# Patient Record
Sex: FEMALE | Race: WHITE | Employment: FULL TIME | URBAN - METROPOLITAN AREA
[De-identification: names, ages, dates, MRNs, and addresses within clinical notes are randomized per-mention and may not be internally consistent; named-entity substitution may affect disease eponyms.]

---

## 2018-07-12 ENCOUNTER — HOSPITAL ENCOUNTER (EMERGENCY)
Facility: CLINIC | Age: 23
Discharge: HOME | End: 2018-07-12
Attending: EMERGENCY MEDICINE | Admitting: EMERGENCY MEDICINE
Payer: COMMERCIAL

## 2018-07-12 VITALS
SYSTOLIC BLOOD PRESSURE: 118 MMHG | TEMPERATURE: 98.4 F | DIASTOLIC BLOOD PRESSURE: 91 MMHG | HEIGHT: 64 IN | WEIGHT: 135 LBS | BODY MASS INDEX: 23.05 KG/M2 | HEART RATE: 160 BPM | OXYGEN SATURATION: 98 % | RESPIRATION RATE: 16 BRPM

## 2018-07-12 DIAGNOSIS — R00.0 SINUS TACHYCARDIA: ICD-10-CM

## 2018-07-12 LAB
ALBUMIN UR-MCNC: NEGATIVE MG/DL
ANION GAP SERPL CALCULATED.3IONS-SCNC: 9 MMOL/L (ref 3–14)
APPEARANCE UR: ABNORMAL
BASOPHILS # BLD AUTO: 0.1 10E9/L (ref 0–0.2)
BASOPHILS NFR BLD AUTO: 0.6 %
BILIRUB UR QL STRIP: NEGATIVE
BUN SERPL-MCNC: 16 MG/DL (ref 7–30)
CALCIUM SERPL-MCNC: 9.4 MG/DL (ref 8.5–10.1)
CHLORIDE SERPL-SCNC: 104 MMOL/L (ref 94–109)
CO2 SERPL-SCNC: 26 MMOL/L (ref 20–32)
COLOR UR AUTO: YELLOW
CREAT SERPL-MCNC: 0.8 MG/DL (ref 0.52–1.04)
DIFFERENTIAL METHOD BLD: ABNORMAL
EOSINOPHIL # BLD AUTO: 0.2 10E9/L (ref 0–0.7)
EOSINOPHIL NFR BLD AUTO: 1.6 %
ERYTHROCYTE [DISTWIDTH] IN BLOOD BY AUTOMATED COUNT: 11.9 % (ref 10–15)
GFR SERPL CREATININE-BSD FRML MDRD: 88 ML/MIN/1.7M2
GLUCOSE SERPL-MCNC: 98 MG/DL (ref 70–99)
GLUCOSE UR STRIP-MCNC: NEGATIVE MG/DL
HCG SERPL QL: NEGATIVE
HCT VFR BLD AUTO: 44.9 % (ref 35–47)
HGB BLD-MCNC: 15.2 G/DL (ref 11.7–15.7)
HGB UR QL STRIP: NEGATIVE
IMM GRANULOCYTES # BLD: 0.1 10E9/L (ref 0–0.4)
IMM GRANULOCYTES NFR BLD: 0.4 %
KETONES UR STRIP-MCNC: NEGATIVE MG/DL
LEUKOCYTE ESTERASE UR QL STRIP: NEGATIVE
LYMPHOCYTES # BLD AUTO: 3.7 10E9/L (ref 0.8–5.3)
LYMPHOCYTES NFR BLD AUTO: 26.3 %
MAGNESIUM SERPL-MCNC: 2.2 MG/DL (ref 1.6–2.3)
MCH RBC QN AUTO: 30.5 PG (ref 26.5–33)
MCHC RBC AUTO-ENTMCNC: 33.9 G/DL (ref 31.5–36.5)
MCV RBC AUTO: 90 FL (ref 78–100)
MONOCYTES # BLD AUTO: 1.2 10E9/L (ref 0–1.3)
MONOCYTES NFR BLD AUTO: 8.3 %
MUCOUS THREADS #/AREA URNS LPF: PRESENT /LPF
NEUTROPHILS # BLD AUTO: 8.8 10E9/L (ref 1.6–8.3)
NEUTROPHILS NFR BLD AUTO: 62.8 %
NITRATE UR QL: NEGATIVE
NRBC # BLD AUTO: 0 10*3/UL
NRBC BLD AUTO-RTO: 0 /100
PH UR STRIP: 6 PH (ref 5–7)
PLATELET # BLD AUTO: 385 10E9/L (ref 150–450)
POTASSIUM SERPL-SCNC: 3.7 MMOL/L (ref 3.4–5.3)
RBC # BLD AUTO: 4.98 10E12/L (ref 3.8–5.2)
RBC #/AREA URNS AUTO: 1 /HPF (ref 0–2)
SODIUM SERPL-SCNC: 139 MMOL/L (ref 133–144)
SOURCE: ABNORMAL
SP GR UR STRIP: 1.02 (ref 1–1.03)
SQUAMOUS #/AREA URNS AUTO: 2 /HPF (ref 0–1)
TSH SERPL DL<=0.005 MIU/L-ACNC: 1.98 MU/L (ref 0.4–4)
UROBILINOGEN UR STRIP-MCNC: 0 MG/DL (ref 0–2)
WBC # BLD AUTO: 14 10E9/L (ref 4–11)
WBC #/AREA URNS AUTO: 1 /HPF (ref 0–5)

## 2018-07-12 PROCEDURE — 80048 BASIC METABOLIC PNL TOTAL CA: CPT | Performed by: EMERGENCY MEDICINE

## 2018-07-12 PROCEDURE — 99284 EMERGENCY DEPT VISIT MOD MDM: CPT | Mod: 25

## 2018-07-12 PROCEDURE — 93005 ELECTROCARDIOGRAM TRACING: CPT

## 2018-07-12 PROCEDURE — 83735 ASSAY OF MAGNESIUM: CPT | Performed by: EMERGENCY MEDICINE

## 2018-07-12 PROCEDURE — 84703 CHORIONIC GONADOTROPIN ASSAY: CPT | Performed by: EMERGENCY MEDICINE

## 2018-07-12 PROCEDURE — 84443 ASSAY THYROID STIM HORMONE: CPT | Performed by: EMERGENCY MEDICINE

## 2018-07-12 PROCEDURE — 25000125 ZZHC RX 250: Performed by: EMERGENCY MEDICINE

## 2018-07-12 PROCEDURE — 25000128 H RX IP 250 OP 636: Performed by: EMERGENCY MEDICINE

## 2018-07-12 PROCEDURE — 96361 HYDRATE IV INFUSION ADD-ON: CPT

## 2018-07-12 PROCEDURE — 96374 THER/PROPH/DIAG INJ IV PUSH: CPT

## 2018-07-12 PROCEDURE — 93005 ELECTROCARDIOGRAM TRACING: CPT | Mod: 76

## 2018-07-12 PROCEDURE — 81001 URINALYSIS AUTO W/SCOPE: CPT | Performed by: EMERGENCY MEDICINE

## 2018-07-12 PROCEDURE — 85025 COMPLETE CBC W/AUTO DIFF WBC: CPT | Performed by: EMERGENCY MEDICINE

## 2018-07-12 RX ORDER — METOPROLOL TARTRATE 1 MG/ML
5 INJECTION, SOLUTION INTRAVENOUS EVERY 5 MIN PRN
Status: DISCONTINUED | OUTPATIENT
Start: 2018-07-12 | End: 2018-07-12 | Stop reason: HOSPADM

## 2018-07-12 RX ORDER — SODIUM CHLORIDE 9 MG/ML
1000 INJECTION, SOLUTION INTRAVENOUS CONTINUOUS
Status: DISCONTINUED | OUTPATIENT
Start: 2018-07-12 | End: 2018-07-12 | Stop reason: HOSPADM

## 2018-07-12 RX ORDER — LIDOCAINE 40 MG/G
CREAM TOPICAL
Status: DISCONTINUED | OUTPATIENT
Start: 2018-07-12 | End: 2018-07-12 | Stop reason: HOSPADM

## 2018-07-12 RX ADMIN — SODIUM CHLORIDE 1000 ML: 9 INJECTION, SOLUTION INTRAVENOUS at 16:44

## 2018-07-12 RX ADMIN — METOPROLOL TARTRATE 5 MG: 5 INJECTION, SOLUTION INTRAVENOUS at 16:44

## 2018-07-12 ASSESSMENT — ENCOUNTER SYMPTOMS
PALPITATIONS: 1
SHORTNESS OF BREATH: 0

## 2018-07-12 NOTE — ED AVS SNAPSHOT
[facility] Emergency Department    [address]    [facility] [address]    Phone:  [phone]    Fax:  [phone]                                       Kaya Pelayo   MRN: [id number]    Department:  [facility] Emergency Department   Date of Visit:  7/12/2018           After Visit Summary Signature Page     I have received my discharge instructions, and my questions have been answered. I have discussed any challenges I see with this plan with the nurse or doctor.    ..........................................................................................................................................  Patient/Patient Representative Signature      ..........................................................................................................................................  Patient Representative Print Name and Relationship to Patient    ..................................................               ................................................  Date                                            Time    ..........................................................................................................................................  Reviewed by Signature/Title    ...................................................              ..............................................  Date                                                            Time

## 2018-07-12 NOTE — ED PROVIDER NOTES
"  History     Chief Complaint:  Palpitations     HPI   Kaya Pelayo is a 22 year old female who presents accompanied by her mother for evaluation of palpitations. Today around 1400, the patient was at work when she started to develop palpitations, which she describes as the sensation of a rapid irregular heart beat. Due to these new palpitations, the patient came into the ED for evaluation. She reports that she has previously had similar episodes of palpitations that started about a year ago and she has not been evaluated regarding these. She reports that these episodes of palpitations are most frequently triggered when she gets up after bending over. She denies any caffeine use or drug use and states that she has not had any alcohol today. Otherwise, she denies any recent chest pain, shortness of breath, or leg swelling. She does not know if she could be pregnant.     Allergies:  NKDA      Medications:    The patient is not currently taking any prescribed medications.      Past Medical History:    The patient denies any relevant past medical history.     Past Surgical History:    Gyn surgery     Family History:    History reviewed. No pertinent family history.     Social History:  Tobacco use:    Current every day smoker - 0.50 packs / day   Alcohol use:    Positive  Marital status:    Single   Accompanied to ED by:  Friend     Review of Systems   Respiratory: Negative for shortness of breath.    Cardiovascular: Positive for palpitations. Negative for chest pain and leg swelling.   All other systems reviewed and are negative.    Physical Exam   First Vitals:  BP: (!) 148/91  Pulse: 160  Heart Rate: 114  Temp: 98.4  F (36.9  C)  Resp: 16  Height: 162.6 cm (5' 4\")  Weight: 61.2 kg (135 lb)  SpO2: 98 %      Physical Exam   Constitutional: She appears well-developed and well-nourished.   HENT:   Right Ear: External ear normal.   Left Ear: External ear normal.   Mouth/Throat: Oropharynx is clear and moist. No " oropharyngeal exudate.   TM's clear bilaterally   Eyes: Conjunctivae are normal. Pupils are equal, round, and reactive to light. No scleral icterus.   Neck: Normal range of motion. Neck supple.   Cardiovascular: Regular rhythm, normal heart sounds and intact distal pulses.  Exam reveals no gallop and no friction rub.    No murmur heard.  tachycardic   Pulmonary/Chest: Effort normal and breath sounds normal. No respiratory distress. She has no wheezes. She has no rales.   Abdominal: Soft. Bowel sounds are normal. She exhibits no distension and no mass. There is no tenderness.   Musculoskeletal: Normal range of motion. She exhibits no edema.   Neurological: She is alert.   Skin: Skin is warm and dry. No rash noted.   Psychiatric: She has a normal mood and affect.     Emergency Department Course   ECG (16:12:15):  Indication: Screening for cardiovascular disease.   Rate 147 bpm. NY interval 280 ms. QRS duration 88 ms. QT/QTc 282/441 ms. P-R-T axes 3 84 -10.   Interpretation: Sinus tachycardia with 1st degree AV block, Abnormal QRS-T angle consider primary T wave abnormality, Abnormal ECG   Agree with computer interpretation. Yes    Interpreted at 1614 by Dr. Dang.      ECG (17:52:12):  Indication: Screening for cardiovascular disease.   Rate 87 bpm. NY interval 180 ms. QRS duration 76 ms. QT/QTc 366/440 ms. P-R-T axes 71 71 34.   Interpretation: Normal sinus rhythm, Normal ECG  Agree with computer interpretation. Yes   Interpreted at 1755 by Dr. Dang.      Laboratory:  CBC: WBC 14.0 high, o/w WNL (HGB 15.2, )  BMP: WNL (Creatinine 0.80)  Magnesium: 2.2   HCG Qualitative Blood: Negative  TSH with free T4 reflex: 1.98   UA with Microscopic: Squamous epithelial 2 high, Mucous present, o/w Negative      Interventions:  1644 Metoprolol 5 mg IV   1644 NS 1,000 mL IV     Emergency Department Course:  Nursing notes and vitals reviewed.  1617: I performed an exam of the patient as documented above.     1804: I updated  and reassessed the patient.     I personally reviewed the laboratory results with the patient and answered all related questions prior to discharge.     Findings and plan explained to the Patient. Patient discharged home with instructions regarding supportive care, medications, and reasons to return. The importance of close follow-up was reviewed.     Impression & Plan      Medical Decision Making:  Kaya Pelayo is a 22 year old female who presents with tachycardia that started all of a sudden. She has been having this off and on but never sought evaluation. Heart rate was 100-120 on my examination. Her initial EKG showed sinus tachycardia. She did not respond to vagal maneuvers on exam. We gave her 5 mg IV Metoprolol and her heart rate is now in the 80s and even with activity seems to stay around there. Repeat EKG showed sinus rhythm without any ST changes. She was referred to have a Holter monitor just to make sure that there is no other rhythm that is causing her tachycardia. She was referred back to her doctor as well. She was quite concerned about out of pocket pay, so I asked her to follow up with her doctor if she did not want to get the Holter monitor placed at [facility]. She voiced understanding. The patient was discharged in stable condition.     Diagnosis:    ICD-10-CM   1. Sinus tachycardia R00.0       Disposition:  Discharged to home.       I, Chuck Yu, am serving as a scribe at 4:17 PM on 7/12/2018 to document services personally performed by Dr. Dang, based on my observations and the provider's statements to me.    [facility] EMERGENCY DEPARTMENT       Kasia Dang MD  07/12/18 [id number]

## 2018-07-12 NOTE — ED TRIAGE NOTES
"Pt was at work and noticed feeling \"like my heart was going to beat out of my chest and short of breath.\"  Pt states she has felt this once before.   "

## 2018-07-12 NOTE — DISCHARGE INSTRUCTIONS
You are referred to have a HOLTER MONITOR placed to evaluate your rapid hear rate.        Understanding Tachycardia    The heart has an electrical system that sends signals to control the heartbeat. Any abnormal change in the speed or pattern of the heartbeat is called an arrhythmia. If you have an arrhythmia that causes the heart to beat faster than normal, this is known as tachycardia. There are many types of tachycardia. They can affect the heart s upper chambers (atria), the heart s lower chambers (ventricles), or both.  What causes tachycardia?  Many things can cause tachycardia, including:    Damage to heart tissue from heart disease, past heart attack, or heart surgery    Abnormal electrical pathways in the heart    Problems with the heart s structure that you are born with     High blood pressure    Overactive thyroid    Use of certain medicines    Severe blood loss or anemia    Dehydration    Severe stress, fear, or anxiety    Smoking    Too much alcohol or caffeine    Abuse of certain street drugs, such as cocaine    Infections    Certain inflammatory conditions    Chronic  pain syndromes  What are the symptoms of tachycardia?  Tachycardia can cause a fast, pounding, or irregular heartbeat. It can also make it harder for the heart to pump blood efficiently to the body. This may cause symptoms such as:    Shortness of breath    Tiredness    Dizziness or fainting    Chest pain  Some people with tachycardia may have no symptoms at all.  How is tachycardiatreated?  Treatment for tachycardia depends on the cause. It also depends on the type you have and how severe your symptoms are. Tachycardia in the ventricles is often more serious than in the atria. For this reason, it may need to be treated right away. Possible treatments include:    Treating the underlying cause. For instance, if a medicine is causing your tachycardia, changing the dosage or stopping the medicine with your doctor s guidance may correct the  problem.    Lifestyle changes. These include getting enough sleep and reducing stress. They also include avoiding caffeine, alcohol, tobacco, and street drugs.    Vagal maneuvers.These are techniques that may help interrupt a fast heartbeat and slow it down. One example is to take a deep breath and bear down hard while holding your breath.     Medicines. These may be used to help slow down a fast heartbeat. They may also be used to regulate the pattern of the heartbeat.    Electrical cardioversion. Special pads or paddles are used to send one or more brief  electrical shocks to the heart. This can help restore the heartbeat to normal.    Ablation. A long thin tube called a catheter is inserted into a blood vessel and threaded to the heart. The catheter sends out hot or cold energy to the areas causing abnormal signals. This destroys the problem tissue or cells. This may stop certain types of tachycardia.    Pacemaker. This is a device that is placed just under the skin in the chest. It sends paced signals to make the heart beat at a more normal rate and rhythm. You may need this when certain medicines that treat tachycardia also result in a slow heart rate.      Implantable cardioverter defibrillator (ICD). This is a device that is placed just under the skin in the chest or armpit. The ICD monitors your heart rate. When needed, it sends controlled burst of signals to the heart to overdrive a tachycardia.  It can also send a single stronger shock to the heart to stop a life-threatening type of tachycardia, if needed.    Surgery. During surgery, different techniques may be used to create scar tissue in the areas of the heart causing abnormal signals. This may help stop certain types of tachycardia.  What are the complications of tachycardia?  These can include:    Blood clots or stroke    Heart failure. With this problem, the heart muscle is so weak it no longer pumps blood well.    Sudden cardiac arrest. This is when  the heart suddenly stops beating.  When should I call my healthcare provider?  Call your healthcare provider right away if you have any of these:    Symptoms that don t get better with treatment, or get worse    New symptoms   Date Last Reviewed: 5/1/2016 2000-2017 [employer]. [address] [place] [id number]. All rights reserved. This information is not intended as a substitute for professional medical care. Always follow your healthcare professional's instructions.

## 2018-07-12 NOTE — ED AVS SNAPSHOT
[facility] Emergency Department    [address] [facility] [address]    Phone:  [phone]    Fax:  [phone]                                       Kaya Pelayo   MRN: [id number]    Department:  [facility] Emergency Department   Date of Visit:  7/12/2018           Patient Information     Date Of Birth          1995        Your diagnoses for this visit were:     Sinus tachycardia        You were seen by Kasia Dang MD.      Follow-up Information     Follow up with Clinic, [facility]. Go in 4 days.    Contact information:    [address] [place] [address]  [phone]          Discharge Instructions       You are referred to have a HOLTER MONITOR placed to evaluate your rapid hear rate.        Understanding Tachycardia    The heart has an electrical system that sends signals to control the heartbeat. Any abnormal change in the speed or pattern of the heartbeat is called an arrhythmia. If you have an arrhythmia that causes the heart to beat faster than normal, this is known as tachycardia. There are many types of tachycardia. They can affect the heart s upper chambers (atria), the heart s lower chambers (ventricles), or both.  What causes tachycardia?  Many things can cause tachycardia, including:    Damage to heart tissue from heart disease, past heart attack, or heart surgery    Abnormal electrical pathways in the heart    Problems with the heart s structure that you are born with     High blood pressure    Overactive thyroid    Use of certain medicines    Severe blood loss or anemia    Dehydration    Severe stress, fear, or anxiety    Smoking    Too much alcohol or caffeine    Abuse of certain street drugs, such as cocaine    Infections    Certain inflammatory conditions    Chronic  pain syndromes  What are the symptoms of tachycardia?  Tachycardia can cause a fast, pounding, or irregular heartbeat. It can also make it harder for  the heart to pump blood efficiently to the body. This may cause symptoms such as:    Shortness of breath    Tiredness    Dizziness or fainting    Chest pain  Some people with tachycardia may have no symptoms at all.  How is tachycardiatreated?  Treatment for tachycardia depends on the cause. It also depends on the type you have and how severe your symptoms are. Tachycardia in the ventricles is often more serious than in the atria. For this reason, it may need to be treated right away. Possible treatments include:    Treating the underlying cause. For instance, if a medicine is causing your tachycardia, changing the dosage or stopping the medicine with your doctor s guidance may correct the problem.    Lifestyle changes. These include getting enough sleep and reducing stress. They also include avoiding caffeine, alcohol, tobacco, and street drugs.    Vagal maneuvers.These are techniques that may help interrupt a fast heartbeat and slow it down. One example is to take a deep breath and bear down hard while holding your breath.     Medicines. These may be used to help slow down a fast heartbeat. They may also be used to regulate the pattern of the heartbeat.    Electrical cardioversion. Special pads or paddles are used to send one or more brief  electrical shocks to the heart. This can help restore the heartbeat to normal.    Ablation. A long thin tube called a catheter is inserted into a blood vessel and threaded to the heart. The catheter sends out hot or cold energy to the areas causing abnormal signals. This destroys the problem tissue or cells. This may stop certain types of tachycardia.    Pacemaker. This is a device that is placed just under the skin in the chest. It sends paced signals to make the heart beat at a more normal rate and rhythm. You may need this when certain medicines that treat tachycardia also result in a slow heart rate.      Implantable cardioverter defibrillator (ICD). This is a device that  is placed just under the skin in the chest or armpit. The ICD monitors your heart rate. When needed, it sends controlled burst of signals to the heart to overdrive a tachycardia.  It can also send a single stronger shock to the heart to stop a life-threatening type of tachycardia, if needed.    Surgery. During surgery, different techniques may be used to create scar tissue in the areas of the heart causing abnormal signals. This may help stop certain types of tachycardia.  What are the complications of tachycardia?  These can include:    Blood clots or stroke    Heart failure. With this problem, the heart muscle is so weak it no longer pumps blood well.    Sudden cardiac arrest. This is when the heart suddenly stops beating.  When should I call my healthcare provider?  Call your healthcare provider right away if you have any of these:    Symptoms that don t get better with treatment, or get worse    New symptoms   Date Last Reviewed: 5/1/2016 2000-2017 [employer]. [address] [place] [address]. All rights reserved. This information is not intended as a substitute for professional medical care. Always follow your healthcare professional's instructions.          24 Hour Appointment Hotline       To make an appointment at any [facility], call [phone] ([phone]). If you don't have a family doctor or clinic, we will help you find one. [facility] clinics are conveniently located to serve the needs of you and your family.          ED Discharge Orders     Zio Patch Holter                    Review of your medicines      Notice     You have not been prescribed any medications.            Procedures and tests performed during your visit     Procedure/Test Number of Times Performed    Basic metabolic panel 1    CBC with platelets differential 1    Cardiac Continuous Monitoring 1    EKG 12 lead 2    HCG qualitative Blood 1    Magnesium 1    Peripheral IV catheter 1    Pulse oximetry  nursing 1    TSH with free T4 reflex 1    UA with Microscopic 1      Orders Needing Specimen Collection     None      Pending Results     No orders found from 7/10/2018 to 7/13/2018.            Pending Culture Results     No orders found from 7/10/2018 to 7/13/2018.            Pending Results Instructions     If you had any lab results that were not finalized at the time of your Discharge, you can call the ED Lab Result RN at [phone]. You will be contacted by this team for any positive Lab results or changes in treatment. The nurses are available 7 days a week from 10A to 6:30P.  You can leave a message 24 hours per day and they will return your call.        Test Results From Your Hospital Stay        7/12/2018  4:36 PM      Component Results     Component Value Ref Range & Units Status    WBC 14.0 (H) 4.0 - 11.0 10e9/L Final    RBC Count 4.98 3.8 - 5.2 10e12/L Final    Hemoglobin 15.2 11.7 - 15.7 g/dL Final    Hematocrit 44.9 35.0 - 47.0 % Final    MCV 90 78 - 100 fl Final    MCH 30.5 26.5 - 33.0 pg Final    MCHC 33.9 31.5 - 36.5 g/dL Final    RDW 11.9 10.0 - 15.0 % Final    Platelet Count 385 150 - 450 10e9/L Final    Diff Method Automated Method  Final    % Neutrophils 62.8 % Final    % Lymphocytes 26.3 % Final    % Monocytes 8.3 % Final    % Eosinophils 1.6 % Final    % Basophils 0.6 % Final    % Immature Granulocytes 0.4 % Final    Nucleated RBCs 0 0 /100 Final    Absolute Neutrophil 8.8 (H) 1.6 - 8.3 10e9/L Final    Absolute Lymphocytes 3.7 0.8 - 5.3 10e9/L Final    Absolute Monocytes 1.2 0.0 - 1.3 10e9/L Final    Absolute Eosinophils 0.2 0.0 - 0.7 10e9/L Final    Absolute Basophils 0.1 0.0 - 0.2 10e9/L Final    Abs Immature Granulocytes 0.1 0 - 0.4 10e9/L Final    Absolute Nucleated RBC 0.0  Final         7/12/2018  4:52 PM      Component Results     Component Value Ref Range & Units Status    Sodium 139 133 - 144 mmol/L Final    Potassium 3.7 3.4 - 5.3 mmol/L Final    Chloride 104 94 - 109 mmol/L Final     Carbon Dioxide 26 20 - 32 mmol/L Final    Anion Gap 9 3 - 14 mmol/L Final    Glucose 98 70 - 99 mg/dL Final    Urea Nitrogen 16 7 - 30 mg/dL Final    Creatinine 0.80 0.52 - 1.04 mg/dL Final    GFR Estimate 88 >60 mL/min/1.7m2 Final    Non  GFR Calc    GFR Estimate If Black >90 >60 mL/min/1.7m2 Final    African American GFR Calc    Calcium 9.4 8.5 - 10.1 mg/dL Final         7/12/2018  4:52 PM      Component Results     Component Value Ref Range & Units Status    Magnesium 2.2 1.6 - 2.3 mg/dL Final         7/12/2018  4:51 PM      Component Results     Component Value Ref Range & Units Status    HCG Qualitative Serum Negative NEG^Negative Final    This test is for screening purposes.  Results should be interpreted along with   the clinical picture.  Confirmation testing is available if warranted by   ordering [id number], HCG Quantitative Pregnancy.           7/12/2018  5:56 PM      Component Results     Component Value Ref Range & Units Status    Color Urine Yellow  Final    Appearance Urine Slightly Cloudy  Final    Glucose Urine Negative NEG^Negative mg/dL Final    Bilirubin Urine Negative NEG^Negative Final    Ketones Urine Negative NEG^Negative mg/dL Final    Specific Gravity Urine 1.017 1.003 - 1.035 Final    Blood Urine Negative NEG^Negative Final    pH Urine 6.0 5.0 - 7.0 pH Final    Protein Albumin Urine Negative NEG^Negative mg/dL Final    Urobilinogen mg/dL 0.0 0.0 - 2.0 mg/dL Final    Nitrite Urine Negative NEG^Negative Final    Leukocyte Esterase Urine Negative NEG^Negative Final    Source Midstream Urine  Final    WBC Urine 1 0 - 5 /HPF Final    RBC Urine 1 0 - 2 /HPF Final    Squamous Epithelial /HPF Urine 2 (H) 0 - 1 /HPF Final    Mucous Urine Present (A) NEG^Negative /LPF Final         7/12/2018  5:35 PM      Component Results     Component Value Ref Range & Units Status    TSH 1.98 0.40 - 4.00 mU/L Final                Clinical Quality Measure: Blood Pressure Screening     Your blood  "pressure was checked while you were in the emergency department today. The last reading we obtained was  BP: (!) 118/91 . Please read the guidelines below about what these numbers mean and what you should do about them.  If your systolic blood pressure (the top number) is less than 120 and your diastolic blood pressure (the bottom number) is less than 80, then your blood pressure is normal. There is nothing more that you need to do about it.  If your systolic blood pressure (the top number) is 120-139 or your diastolic blood pressure (the bottom number) is 80-89, your blood pressure may be higher than it should be. You should have your blood pressure rechecked within a year by a primary care provider.  If your systolic blood pressure (the top number) is 140 or greater or your diastolic blood pressure (the bottom number) is 90 or greater, you may have high blood pressure. High blood pressure is treatable, but if left untreated over time it can put you at risk for heart attack, stroke, or kidney failure. You should have your blood pressure rechecked by a primary care provider within the next 4 weeks.  If your provider in the emergency department today gave you specific instructions to follow-up with your doctor or provider even sooner than that, you should follow that instruction and not wait for up to 4 weeks for your follow-up visit.        Thank you for choosing [facility]       Thank you for choosing [facility] for your care. Our goal is always to provide you with excellent care. Hearing back from our patients is one way we can continue to improve our services. Please take a few minutes to complete the written survey that you may receive in the mail after you visit with us. Thank you!        [facility] Information     [facility] lets you send messages to your doctor, view your test results, renew your prescriptions, schedule appointments and more. To sign up, go to [email or web address] . Click on \"Log in\" on the left " "side of the screen, which will take you to the Welcome page. Then click on \"Sign up Now\" on the right side of the page.     You will be asked to enter the access code listed below, as well as some personal information. Please follow the directions to create your username and password.     Your access code is: [email or web address]  Expires: 10/10/2018  6:15 PM     Your access code will  in 90 days. If you need help or a new code, please call your [facility] clinic or [phone].        Care EveryWhere ID     This is your Care EveryWhere ID. This could be used by other organizations to access your [facility] medical records  [id number]        Equal Access to Services     TESSA MORRIS : [place], lucio lizarraga, jenae kaila sánchez, charlotte powell. So [facility] [phone].    ATENCIÓN: Si habla español, tiene a laguna disposición servicios gratuitos de asistencia lingüística. Llame al [phone].    We comply with applicable federal civil rights laws and [place] laws. We do not discriminate on the basis of race, color, national origin, age, disability, sex, sexual orientation, or gender identity.            After Visit Summary       This is your record. Keep this with you and show to your community pharmacist(s) and doctor(s) at your next visit.                  "

## 2018-07-13 LAB
INTERPRETATION ECG - MUSE: NORMAL
INTERPRETATION ECG - MUSE: NORMAL